# Patient Record
Sex: MALE | Race: WHITE | NOT HISPANIC OR LATINO | Employment: UNEMPLOYED | URBAN - METROPOLITAN AREA
[De-identification: names, ages, dates, MRNs, and addresses within clinical notes are randomized per-mention and may not be internally consistent; named-entity substitution may affect disease eponyms.]

---

## 2018-09-27 ENCOUNTER — TREATMENT (OUTPATIENT)
Dept: PHYSICAL THERAPY | Facility: CLINIC | Age: 33
End: 2018-09-27

## 2018-09-27 ENCOUNTER — TRANSCRIBE ORDERS (OUTPATIENT)
Dept: PHYSICAL THERAPY | Facility: CLINIC | Age: 33
End: 2018-09-27

## 2018-09-27 DIAGNOSIS — Z47.89 ORTHOPEDIC AFTERCARE: ICD-10-CM

## 2018-09-27 DIAGNOSIS — M25.632 WRIST STIFFNESS, LEFT: ICD-10-CM

## 2018-09-27 DIAGNOSIS — R53.1 WEAKNESS: ICD-10-CM

## 2018-09-27 DIAGNOSIS — S61.219A LACERATION OF FINGER OF LEFT HAND WITH TENDON INVOLVEMENT, INITIAL ENCOUNTER: Primary | ICD-10-CM

## 2018-09-27 DIAGNOSIS — M25.532 LEFT WRIST PAIN: Primary | ICD-10-CM

## 2018-09-27 DIAGNOSIS — S61.219A LACERATION OF FINGER OF LEFT HAND WITH COMPLICATION, INITIAL ENCOUNTER: ICD-10-CM

## 2018-09-27 PROCEDURE — 97163 PT EVAL HIGH COMPLEX 45 MIN: CPT | Performed by: PHYSICAL THERAPIST

## 2018-09-27 NOTE — PROGRESS NOTES
Physical Therapy Initial Evaluation and Plan of Care    Patient: Rafael Davis   : 1985  Diagnosis/ICD-10 Code:  Left wrist pain [M25.532]  Referring practitioner: No ref. provider found  Date of Initial Visit: 2018  Today's Date: 2018  Patient seen for 1 sessions             Subjective Evaluation    History of Present Illness  Date of onset: 2018  Date of surgery: 2018  Mechanism of injury: Pt cut the volar side of his wrist and had surgery for repair of all the flexor tendons and nerve of the left wrist/hand. Pt was placed in a post surgical dressing up until one week ago and then he was placed in a fiberglass dorsal block splint. He reports that he will be in that splint for possibly one more week and then he will be out of the splint.     He also has c/o numbness           Patient Occupation: unemployeed  Pain  Current pain ratin  At best pain ratin  At worst pain rating: 10  Location: mostly in the palm of the hand with shooting pains into the fingers and cramping of the hand   Quality: cramping (shooting pain)  Relieving factors: rest  Aggravating factors: movement  Progression: no change    Hand dominance: right    Treatments  Previous treatment: immobilization  Patient Goals  Patient goals for therapy: decreased edema, decreased pain, increased motion, increased strength, independence with ADLs/IADLs, return to sport/leisure activities and return to work  Patient goal: Get back to playing piano and drums            Objective       Palpation     Additional Palpation Details  Mild to moderate tenderness to palpation, especially around the incison scar and in the palm    Passive Range of Motion     Right Wrist   Wrist flexion: 51 degrees     Left Thumb   Flexion     MP: 38 degrees    DIP: 20 degrees  Extension     MP: 18 degrees    DIP: 14 degrees  Palmar Abduction     CMC: 27 degrees    Left Digits   Flexion   Index     MCP: 90 degrees    PIP: 45 degrees    DIP: 50  degrees  Middle     MCP: 95 degrees    PIP: 70 degrees    DIP: 54 degrees  Ring     MCP: 96 degrees    PIP: 80 degrees    DIP: 47 degrees  Little     MCP: 76 degrees    PIP: 104 degrees    DIP: 61 degrees         Assessment & Plan     Assessment  Impairments: abnormal muscle firing, abnormal muscle tone, abnormal or restricted ROM, activity intolerance, impaired physical strength, lacks appropriate home exercise program and pain with function  Assessment details: Patient is a 33 year old male who comes to physical therapy with laceration injury to the left wrist with extensive tendon, nerve, and vascular damage resulting in pain, decreased ROM, decreased strength, and inability to perform all essential functional activities. Pt will benefit from skilled PT services to address the above issues.     Prognosis: fair  Prognosis details: Given the extensive damage to contractile, neurological, and vascular tissues as well as the significant scarring that can occur with this type of injury/surgery, this patient will require significant amounts of therapy.    Functional Limitations: carrying objects, lifting, pulling, pushing, uncomfortable because of pain, reaching overhead and unable to perform repetitive tasks  Goals  Plan Goals: SHORT TERM GOALS:   6 weeks    1. Pt to be I with HEP  2. left hand/wrist AROM equal to that of the right hand/wrist to show improve mobility   3. Pt to report 0/10 pain at rest in the left hand/wrist    LONG TERM GOALS:    12 weeks  1. Pt to demonstrate left  strength within 10# or equal to that of right for improved ability to perform lifting activities  2. Pt to report being able to return to work full duty without limitation or exacerbation of symptoms  3. Pt to report being able to play the piano without limitation.         Plan  Therapy options: will be seen for skilled physical therapy services  Planned modality interventions: cryotherapy, electrical stimulation/Russian stimulation,  fluidotherapy, high voltage pulsed current (pain management), iontophoresis, microcurrent electrical stimulation, TENS, thermotherapy (hydrocollator packs) and ultrasound  Planned therapy interventions: ADL retraining, body mechanics training, fine motor coordination training, flexibility, functional ROM exercises, home exercise program, IADL retraining, joint mobilization, manual therapy, motor coordination training, neuromuscular re-education, soft tissue mobilization, strengthening, stretching and therapeutic activities  Frequency: 3x week  Duration in weeks: 12        Evaluation Only     PT SIGNATURE: He Saleem, PT, DPT, OCS, Cert. DN   DATE TREATMENT INITIATED: 9/27/2018    Initial Certification  Certification Period: 12/26/2018  I certify that the therapy services are furnished while this patient is under my care.  The services outlined above are required by this patient, and will be reviewed every 90 days.     PHYSICIAN:       DATE:     Please sign and return via fax to [phone].. Thank you, [facility].

## 2018-10-01 ENCOUNTER — TREATMENT (OUTPATIENT)
Dept: PHYSICAL THERAPY | Facility: CLINIC | Age: 33
End: 2018-10-01

## 2018-10-01 DIAGNOSIS — M25.532 LEFT WRIST PAIN: Primary | ICD-10-CM

## 2018-10-01 DIAGNOSIS — M25.632 WRIST STIFFNESS, LEFT: ICD-10-CM

## 2018-10-01 DIAGNOSIS — R53.1 WEAKNESS: ICD-10-CM

## 2018-10-01 PROCEDURE — 97140 MANUAL THERAPY 1/> REGIONS: CPT | Performed by: PHYSICAL THERAPIST

## 2018-10-01 PROCEDURE — 97110 THERAPEUTIC EXERCISES: CPT | Performed by: PHYSICAL THERAPIST

## 2018-10-03 NOTE — PROGRESS NOTES
Physical Therapy Daily Progress Note    Subjective   Rafael Davis reports that he has been working on a few things at home that were shown to him at the initial evaluation. He continues to have some pain in the palm of the hand and along the incision scar.    Today's Pain ratin/10    Objective   See Exercise, Manual, and Modality Logs for complete treatment.       Assessment/Plan     Gentle AROM exercise performed in the restrictions of the brace. PROM performed into flexion and extension in the brace as well. Pt demonstrated good tolerance to manual therapy in the clinic. He will be going back to see the MD on Wednesday.     Progress per Plan of Care and Progress strengthening /stabilization /functional activity           Manual Therapy:    40     mins  13202;  Therapeutic Exercise:    16     mins  10945;     Neuromuscular Vikki:        mins  78778;    Therapeutic Activity:          mins  95549;     Gait Training:           mins  84926;     Ultrasound:          mins  28371;    Electrical Stimulation:         mins  64570 ( );  Iontophoresis          mins 63600   Traction          mins  79833  Fluidotherapy          mins  69087  Dry Needling          mins self-pay  Paraffin          mins  04173    Timed Treatment:   56   mins   Total Treatment:     56   mins    He Saleem, PT, DPT, OCS, Cert. DN  Physical Therapist

## 2018-10-04 ENCOUNTER — TREATMENT (OUTPATIENT)
Dept: PHYSICAL THERAPY | Facility: CLINIC | Age: 33
End: 2018-10-04

## 2018-10-04 DIAGNOSIS — R53.1 WEAKNESS: ICD-10-CM

## 2018-10-04 DIAGNOSIS — M25.532 LEFT WRIST PAIN: Primary | ICD-10-CM

## 2018-10-04 DIAGNOSIS — M25.632 WRIST STIFFNESS, LEFT: ICD-10-CM

## 2018-10-04 PROCEDURE — 97140 MANUAL THERAPY 1/> REGIONS: CPT | Performed by: PHYSICAL THERAPIST

## 2018-10-04 PROCEDURE — 97110 THERAPEUTIC EXERCISES: CPT | Performed by: PHYSICAL THERAPIST

## 2018-10-08 ENCOUNTER — TREATMENT (OUTPATIENT)
Dept: PHYSICAL THERAPY | Facility: CLINIC | Age: 33
End: 2018-10-08

## 2018-10-08 DIAGNOSIS — M25.632 WRIST STIFFNESS, LEFT: ICD-10-CM

## 2018-10-08 DIAGNOSIS — R53.1 WEAKNESS: ICD-10-CM

## 2018-10-08 DIAGNOSIS — M25.532 LEFT WRIST PAIN: Primary | ICD-10-CM

## 2018-10-08 PROCEDURE — 97140 MANUAL THERAPY 1/> REGIONS: CPT | Performed by: PHYSICAL THERAPIST

## 2018-10-08 PROCEDURE — 97110 THERAPEUTIC EXERCISES: CPT | Performed by: PHYSICAL THERAPIST

## 2018-10-08 PROCEDURE — 97035 APP MDLTY 1+ULTRASOUND EA 15: CPT | Performed by: PHYSICAL THERAPIST

## 2018-10-08 NOTE — PROGRESS NOTES
Physical Therapy Daily Progress Note    Subjective   Rafael Davis reports that he has been cleared to discontinue with use of the brace, he was also instructed to not forcefully extend the wrist of /lift with the left hand.       Objective   See Exercise, Manual, and Modality Logs for complete treatment.       Assessment/Plan     Pt had some soreness with therapy in the clinic today but he did not report any excessive pain. Will continue to progress as appropriate.     Progress per Plan of Care and Progress strengthening /stabilization /functional activity           Manual Therapy:    25     mins  55834;  Therapeutic Exercise:    32     mins  48016;     Neuromuscular Vikki:        mins  01676;    Therapeutic Activity:          mins  45414;     Gait Training:           mins  63564;     Ultrasound:          mins  76994;    Electrical Stimulation:         mins  97933 ( );  Iontophoresis          mins 41962   Traction          mins  61841  Fluidotherapy     15     mins  36764  Dry Needling          mins self-pay  Paraffin          mins  34646    Timed Treatment:   57   mins   Total Treatment:     72   mins    He Saleem, PT, DPT, OCS, Cert. DN  Physical Therapist

## 2018-10-10 ENCOUNTER — TREATMENT (OUTPATIENT)
Dept: PHYSICAL THERAPY | Facility: CLINIC | Age: 33
End: 2018-10-10

## 2018-10-10 PROCEDURE — 97140 MANUAL THERAPY 1/> REGIONS: CPT | Performed by: PHYSICAL THERAPIST

## 2018-10-10 PROCEDURE — 97035 APP MDLTY 1+ULTRASOUND EA 15: CPT | Performed by: PHYSICAL THERAPIST

## 2018-10-10 PROCEDURE — 97110 THERAPEUTIC EXERCISES: CPT | Performed by: PHYSICAL THERAPIST

## 2018-10-10 NOTE — PROGRESS NOTES
Physical Therapy Daily Progress Note    Subjective   Rafael Davis reports that he is feeling like he has more swelling today and has recently had more swelling in the forearm, bernadette along the initial injury scar and at the volar and ulnar aspect of the forearm.       Objective   See Exercise, Manual, and Modality Logs for complete treatment.       Assessment/Plan     Pt is progressing well with therapy, he had some increased swelling today and he had some concern about redness in one of the incision scars, but it appears to be an area where he has a stitch coming through. Overall pt is making steady progress within the limitations of his post surgical protocol.     Progress per Plan of Care and Progress strengthening /stabilization /functional activity           Manual Therapy:    16     mins  58671;  Therapeutic Exercise:    28     mins  56317;     Neuromuscular Vikki:        mins  11278;    Therapeutic Activity:          mins  30547;     Gait Training:           mins  45083;     Ultrasound:     13     mins  92785;    Electrical Stimulation:         mins  35959 ( );  Iontophoresis          mins 84600   Traction          mins  62000  Fluidotherapy     15     mins  55583  Dry Needling          mins self-pay  Paraffin          mins  96376    Timed Treatment:   57   mins   Total Treatment:     72   mins    He Saleem, PT, DPT, OCS, Cert. DN  Physical Therapist

## 2018-10-12 NOTE — PROGRESS NOTES
Physical Therapy Daily Progress Note    Subjective   Rafael Davis reports that the swelling he had earlier in the weeks seems to be going down now, he feels like he is able to move his fingers more now and isolate his movement now as well.     Objective   See Exercise, Manual, and Modality Logs for complete treatment.       Assessment/Plan     Pt is making steady progress with therapy and he demonstrates and improvement in his AROM within the limits of post surgical protocol.     Progress per Plan of Care and Progress strengthening /stabilization /functional activity           Manual Therapy:    30     mins  60322;  Therapeutic Exercise:    18     mins  65109;     Neuromuscular Vikki:        mins  71805;    Therapeutic Activity:          mins  98142;     Gait Training:           mins  64640;     Ultrasound:     13     mins  98351;    Electrical Stimulation:         mins  14571 ( );  Iontophoresis          mins 31847   Traction          mins  54965  Fluidotherapy     15     mins  43682  Dry Needling          mins self-pay  Paraffin          mins  78410    Timed Treatment:   61   mins   Total Treatment:     76   mins    He Saleem, PT, DPT, OCS, Cert. DN  Physical Therapist

## 2018-10-17 ENCOUNTER — TREATMENT (OUTPATIENT)
Dept: PHYSICAL THERAPY | Facility: CLINIC | Age: 33
End: 2018-10-17

## 2018-10-17 DIAGNOSIS — M25.532 LEFT WRIST PAIN: Primary | ICD-10-CM

## 2018-10-17 DIAGNOSIS — M25.632 WRIST STIFFNESS, LEFT: ICD-10-CM

## 2018-10-17 DIAGNOSIS — R53.1 WEAKNESS: ICD-10-CM

## 2018-10-17 PROCEDURE — 97110 THERAPEUTIC EXERCISES: CPT | Performed by: PHYSICAL THERAPIST

## 2018-10-17 PROCEDURE — 97140 MANUAL THERAPY 1/> REGIONS: CPT | Performed by: PHYSICAL THERAPIST

## 2018-10-18 NOTE — PROGRESS NOTES
Physical Therapy Daily Progress Note    Subjective   Rafael Davis reports that he has been playing the piano more, but he still has swelling the day after he does more activity.       Objective   See Exercise, Manual, and Modality Logs for complete treatment.       Assessment/Plan     Initiated some PROM extension of the wrist and fingers today. NMES/russion e-stim performed today in the clinic to improve muscle avtivation of the wrist flexors.     Progress per Plan of Care and Progress strengthening /stabilization /functional activity           Manual Therapy:    40     mins  18261;  Therapeutic Exercise:    15     mins  60037;     Neuromuscular Vikki:        mins  70342;    Therapeutic Activity:          mins  76330;     Gait Training:           mins  47149;     Ultrasound:     13     mins  51102;    Electrical Stimulation:    20     mins  98464 ( );  Iontophoresis          mins 26858   Traction          mins  84855  Fluidotherapy    15      mins  70183  Dry Needling          mins self-pay  Paraffin          mins  79436    Timed Treatment:   68   mins   Total Treatment:     103   mins    He Saleem, PT, DPT, OCS, Cert. DN  Physical Therapist

## 2018-10-19 ENCOUNTER — TREATMENT (OUTPATIENT)
Dept: PHYSICAL THERAPY | Facility: CLINIC | Age: 33
End: 2018-10-19

## 2018-10-19 DIAGNOSIS — M25.632 WRIST STIFFNESS, LEFT: ICD-10-CM

## 2018-10-19 DIAGNOSIS — R53.1 WEAKNESS: ICD-10-CM

## 2018-10-19 DIAGNOSIS — M25.532 LEFT WRIST PAIN: Primary | ICD-10-CM

## 2018-10-19 PROCEDURE — 97110 THERAPEUTIC EXERCISES: CPT | Performed by: PHYSICAL THERAPIST

## 2018-10-19 PROCEDURE — 97035 APP MDLTY 1+ULTRASOUND EA 15: CPT | Performed by: PHYSICAL THERAPIST

## 2018-10-19 PROCEDURE — 97112 NEUROMUSCULAR REEDUCATION: CPT | Performed by: PHYSICAL THERAPIST

## 2018-10-19 PROCEDURE — 97140 MANUAL THERAPY 1/> REGIONS: CPT | Performed by: PHYSICAL THERAPIST

## 2018-10-22 ENCOUNTER — TREATMENT (OUTPATIENT)
Dept: PHYSICAL THERAPY | Facility: CLINIC | Age: 33
End: 2018-10-22

## 2018-10-22 DIAGNOSIS — M25.532 LEFT WRIST PAIN: Primary | ICD-10-CM

## 2018-10-22 DIAGNOSIS — R53.1 WEAKNESS: ICD-10-CM

## 2018-10-22 DIAGNOSIS — M25.632 WRIST STIFFNESS, LEFT: ICD-10-CM

## 2018-10-22 PROCEDURE — 97140 MANUAL THERAPY 1/> REGIONS: CPT | Performed by: PHYSICAL THERAPIST

## 2018-10-22 PROCEDURE — 97110 THERAPEUTIC EXERCISES: CPT | Performed by: PHYSICAL THERAPIST

## 2018-10-22 PROCEDURE — 97035 APP MDLTY 1+ULTRASOUND EA 15: CPT | Performed by: PHYSICAL THERAPIST

## 2018-10-22 NOTE — PROGRESS NOTES
Subjective   Rafael Davis reports: Wrist pain can get up to about 8/10 at times.  Now 4/10.  Some numbness and tingling in the IF and Thumb.  Fingers are very stiff. Did Play the piano for about 2 hours.    Objective   OBSERVATION:  He can begin to make an incomplete hook fist on LF/RF/SF.  He cannot make straight fist with any finger, observed scar at volar wrist/forearm pull proximally when this is attempted.   ROM: Finger PROM is functional, has significant limitation of AROM of fingers.   See Exercise, Manual, and Modality Logs for complete treatment.       Assessment/Plan  Suspect FDS is experiencing scar adhesion. Nice progression of the finger IP joint capsulitis issues. Very common for zone IV and V laceration to scar significantly.  Pt is far enough out of surgery he can become more aggressive with his ROM and activities at home.  Progress per Plan of Care and Progress strengthening /stabilization /functional activity           Manual Therapy:    35     mins  43159;  Therapeutic Exercise:    23     mins  67662;     Neuromuscular Vikki:   15     mins  38575;    Therapeutic Activity:          mins  95295;     Gait Training:           mins  38728;     Ultrasound:     13     mins  57057;   Iontophoresis          mins  45092   Electrical Stimulation:         mins  18617 ( );  Dry Needling          mins self-pay  Fluidotherapy          mins 50804  Traction          mins 58229  Paraffin:          mins  24768    Timed Treatment:   86   mins   Total Treatment:     86   mins    Gabino Cabrales, PT  Physical Therapist

## 2018-10-23 NOTE — PROGRESS NOTES
Physical Therapy Daily Progress Note    Subjective   Rafael Davis reports that he has been working on playing piano and drums more lately. He has also been working a lot on AROM of the finger and thumb. He did have some discomfort and grinding noted in the hand/wrist when doing AROM of the thumb and his swelling has been worse since that time.       Objective   See Exercise, Manual, and Modality Logs for complete treatment.       Assessment/Plan     Based on patients description and increase in swelling in the hand, it appears that he may have caused some breaking of adhesions of the thumb flexors when performing AROM of the thumb at home. Initiated place and hold exercise in the clinic today and conituned to progress PROM of the wrist and fingers in both flexion and gentle extension.     Progress per Plan of Care and Progress strengthening /stabilization /functional activity           Manual Therapy:    34     mins  69637;  Therapeutic Exercise:    20     mins  85396;     Neuromuscular Vikki:        mins  70045;    Therapeutic Activity:          mins  04627;     Gait Training:           mins  47240;     Ultrasound:     13     mins  95531;    Electrical Stimulation:         mins  40646 ( );  Iontophoresis          mins 28035   Traction          mins  16026  Fluidotherapy     15     mins  35302  Dry Needling          mins self-pay  Paraffin          mins  92467    Timed Treatment:   67   mins   Total Treatment:     82   mins    He Saleem, PT, DPT, OCS, Cert. DN  Physical Therapist

## 2018-10-24 ENCOUNTER — TREATMENT (OUTPATIENT)
Dept: PHYSICAL THERAPY | Facility: CLINIC | Age: 33
End: 2018-10-24

## 2018-10-24 DIAGNOSIS — M25.532 LEFT WRIST PAIN: Primary | ICD-10-CM

## 2018-10-24 DIAGNOSIS — R53.1 WEAKNESS: ICD-10-CM

## 2018-10-24 DIAGNOSIS — M25.632 WRIST STIFFNESS, LEFT: ICD-10-CM

## 2018-10-24 PROCEDURE — 97110 THERAPEUTIC EXERCISES: CPT | Performed by: PHYSICAL THERAPIST

## 2018-10-24 PROCEDURE — 97140 MANUAL THERAPY 1/> REGIONS: CPT | Performed by: PHYSICAL THERAPIST

## 2018-10-26 ENCOUNTER — TREATMENT (OUTPATIENT)
Dept: PHYSICAL THERAPY | Facility: CLINIC | Age: 33
End: 2018-10-26

## 2018-10-26 DIAGNOSIS — M25.632 WRIST STIFFNESS, LEFT: ICD-10-CM

## 2018-10-26 DIAGNOSIS — M25.532 LEFT WRIST PAIN: Primary | ICD-10-CM

## 2018-10-26 DIAGNOSIS — R53.1 WEAKNESS: ICD-10-CM

## 2018-10-26 PROCEDURE — 97035 APP MDLTY 1+ULTRASOUND EA 15: CPT | Performed by: PHYSICAL THERAPIST

## 2018-10-26 PROCEDURE — 97140 MANUAL THERAPY 1/> REGIONS: CPT | Performed by: PHYSICAL THERAPIST

## 2018-10-26 NOTE — PROGRESS NOTES
Physical Therapy Daily Progress Note    Subjective   Rafael Davis reports that he is continuing to play the piano at home and he is doing more drumming recently as well. He has periods of swelling that seems to limit his motion.       Objective   See Exercise, Manual, and Modality Logs for complete treatment.       Assessment/Plan     Tendon glide seems to be limited by some scar adhesion at the original laceration scar where the surgical incision scar intersects. Will continue to work on scar massage and IASTM for scar mobility and progress strengthening of FDS.     Progress per Plan of Care and Progress strengthening /stabilization /functional activity           Manual Therapy:    32     mins  37884;  Therapeutic Exercise:    28     mins  31429;     Neuromuscular Vikki:        mins  22528;    Therapeutic Activity:          mins  06076;     Gait Training:           mins  54055;     Ultrasound:     13     mins  10336;    Electrical Stimulation:         mins  35091 ( );  Iontophoresis          mins 49940   Traction          mins  82649  Fluidotherapy     15     mins  21411  Dry Needling          mins self-pay  Paraffin          mins  11221    Timed Treatment:   73   mins   Total Treatment:     88   mins    He Saleem, PT, DPT, OCS, Cert. DN  Physical Therapist

## 2018-10-29 ENCOUNTER — TREATMENT (OUTPATIENT)
Dept: PHYSICAL THERAPY | Facility: CLINIC | Age: 33
End: 2018-10-29

## 2018-10-29 DIAGNOSIS — R53.1 WEAKNESS: Primary | ICD-10-CM

## 2018-10-29 DIAGNOSIS — M25.532 LEFT WRIST PAIN: ICD-10-CM

## 2018-10-29 DIAGNOSIS — M25.632 WRIST STIFFNESS, LEFT: ICD-10-CM

## 2018-10-29 PROCEDURE — 97110 THERAPEUTIC EXERCISES: CPT | Performed by: PHYSICAL THERAPIST

## 2018-10-29 PROCEDURE — 97140 MANUAL THERAPY 1/> REGIONS: CPT | Performed by: PHYSICAL THERAPIST

## 2018-10-29 NOTE — PROGRESS NOTES
Physical Therapy Daily Progress Note    Subjective   Rafael Davis reports that he is doing well this week and he is having less swelling and pain. He has been working on tendon glides at home and getting back to playing piano and drums.       Objective   See Exercise, Manual, and Modality Logs for complete treatment.       Assessment/Plan     Continued to work on scar massage at the wrist due to some scarring down of the tendons noted with finger flexion. Improvement noted with PROM wrist extension and finger extension.     Progress per Plan of Care and Progress strengthening /stabilization /functional activity           Manual Therapy:    40     mins  48846;  Therapeutic Exercise:         mins  87230;     Neuromuscular Vikki:        mins  30341;    Therapeutic Activity:          mins  15724;     Gait Training:           mins  93218;     Ultrasound:     13     mins  00791;    Electrical Stimulation:         mins  86627 ( );  Iontophoresis          mins 78967   Traction          mins  29994  Fluidotherapy     15     mins  09342  Dry Needling          mins self-pay  Paraffin          mins  46147    Timed Treatment:   53   mins   Total Treatment:     68   mins    He Saleem, PT, DPT, OCS, Cert. DN  Physical Therapist

## 2018-10-31 ENCOUNTER — TREATMENT (OUTPATIENT)
Dept: PHYSICAL THERAPY | Facility: CLINIC | Age: 33
End: 2018-10-31

## 2018-10-31 DIAGNOSIS — R53.1 WEAKNESS: Primary | ICD-10-CM

## 2018-10-31 DIAGNOSIS — M25.632 WRIST STIFFNESS, LEFT: ICD-10-CM

## 2018-10-31 DIAGNOSIS — M25.532 LEFT WRIST PAIN: ICD-10-CM

## 2018-10-31 PROCEDURE — 97140 MANUAL THERAPY 1/> REGIONS: CPT | Performed by: PHYSICAL THERAPIST

## 2018-10-31 PROCEDURE — 97110 THERAPEUTIC EXERCISES: CPT | Performed by: PHYSICAL THERAPIST

## 2018-10-31 NOTE — PROGRESS NOTES
Physical Therapy Daily Progress Note    Subjective   Rafael Davis reports that he is feeling a little more swollen and sore today. He continues to have good and bad days with his swelling and ROM      Objective   See Exercise, Manual, and Modality Logs for complete treatment.       Assessment/Plan     Pt is progressing well with therapy and he demonstrates an improvement in ability to perform light strengthening activities in the clinic. Will continue to progress as tolerated.    Progress per Plan of Care and Progress strengthening /stabilization /functional activity           Manual Therapy:    32     mins  81998;  Therapeutic Exercise:    24     mins  34778;     Neuromuscular Vikki:        mins  18035;    Therapeutic Activity:          mins  31509;     Gait Training:           mins  99576;     Ultrasound:     13     mins  52387;    Electrical Stimulation:         mins  78817 ( );  Iontophoresis          mins 61553   Traction          mins  40854  Fluidotherapy          mins  39631  Dry Needling          mins self-pay  Paraffin          mins  26112    Timed Treatment:   56   mins   Total Treatment:     69   mins    He Saleem, PT, DPT, OCS, Cert. DN  Physical Therapist

## 2018-11-01 NOTE — PROGRESS NOTES
Physical Therapy Daily Progress Note    Subjective   Rafael Davis reports that he feels like he is making progress and he has been doing a lot of exercise at home and continuing to play piano and drums.       Objective   See Exercise, Manual, and Modality Logs for complete treatment.       Assessment/Plan     Pt is progressing well with therapy and he shows an improvement in his tolerance to activity and AROM of all fingers and the wrist. Will continue to progress functional activity as indicated.     Progress per Plan of Care and Progress strengthening /stabilization /functional activity           Manual Therapy:    32     mins  35166;  Therapeutic Exercise:    30     mins  12872;     Neuromuscular Vikki:        mins  31918;    Therapeutic Activity:          mins  95194;     Gait Training:           mins  80274;     Ultrasound:     13     mins  96234;    Electrical Stimulation:         mins  09554 ( );  Iontophoresis          mins 65704   Traction          mins  29833  Fluidotherapy     15     mins  80248  Dry Needling          mins self-pay  Paraffin          mins  81259    Timed Treatment:   75   mins   Total Treatment:     90   mins    He Saleem, PT, DPT, OCS, Cert. DN  Physical Therapist

## 2018-11-02 ENCOUNTER — TREATMENT (OUTPATIENT)
Dept: PHYSICAL THERAPY | Facility: CLINIC | Age: 33
End: 2018-11-02

## 2018-11-02 DIAGNOSIS — M25.632 WRIST STIFFNESS, LEFT: ICD-10-CM

## 2018-11-02 DIAGNOSIS — M25.532 LEFT WRIST PAIN: ICD-10-CM

## 2018-11-02 DIAGNOSIS — R53.1 WEAKNESS: Primary | ICD-10-CM

## 2018-11-02 PROCEDURE — 97110 THERAPEUTIC EXERCISES: CPT | Performed by: PHYSICAL THERAPIST

## 2018-11-02 PROCEDURE — 97035 APP MDLTY 1+ULTRASOUND EA 15: CPT | Performed by: PHYSICAL THERAPIST

## 2018-11-02 PROCEDURE — 97140 MANUAL THERAPY 1/> REGIONS: CPT | Performed by: PHYSICAL THERAPIST

## 2018-11-02 NOTE — PROGRESS NOTES
Physical Therapy Daily Progress Note        Patient: Rafael Davis   : 1985  Diagnosis/ICD-10 Code:  Weakness [R53.1]  Referring practitioner: JAYLA Pedroza  Date of Initial Visit: Type: THERAPY  Noted: 2018  Today's Date: 2018  Patient seen for 13 sessions           Subjective   Rafael Davis reports: was playing air [employer] yesterday and the cording motion really seemed to loosen the fingers a lot.    Objective   OBSERVATION: Fair+ hook fist with LF, Good - with RF/SF.  IF still limited.  When IF tries to flex surgical scar pulls proximal.  See Exercise, Manual, and Modality Logs for complete treatment.       Assessment/Plan  Tremendous improvement, really ahead of what is expected for him.  IF flexor tendon adhesion about zone V, improving slowly.  Limited thumb CMC joint flexion, suspect due to median nerve injury.  Progress per Plan of Care and Progress strengthening /stabilization /functional activity           Manual Therapy:    20     mins  01494;  Therapeutic Exercise:    35     mins  09709;     Neuromuscular Vikki:        mins  87612;    Therapeutic Activity:          mins  61248;     Gait Training:           mins  57746;     Ultrasound:     13     mins  77545;    Electrical Stimulation:         mins  98051 ( );  Iontophoresis          mins 30066   Traction          mins  39070  Fluidotherapy          mins  94619  Dry Needling          mins self-pay  Paraffin          mins  10607    Timed Treatment:   68   mins   Total Treatment:     68   mins    Gabino Cabrales, PT, CHT  Physical Therapist

## 2018-11-05 ENCOUNTER — TREATMENT (OUTPATIENT)
Dept: PHYSICAL THERAPY | Facility: CLINIC | Age: 33
End: 2018-11-05

## 2018-11-05 DIAGNOSIS — M25.632 WRIST STIFFNESS, LEFT: ICD-10-CM

## 2018-11-05 DIAGNOSIS — R53.1 WEAKNESS: Primary | ICD-10-CM

## 2018-11-05 DIAGNOSIS — M25.532 LEFT WRIST PAIN: ICD-10-CM

## 2018-11-05 PROCEDURE — 97110 THERAPEUTIC EXERCISES: CPT | Performed by: PHYSICAL THERAPIST

## 2018-11-05 PROCEDURE — 97140 MANUAL THERAPY 1/> REGIONS: CPT | Performed by: PHYSICAL THERAPIST

## 2018-11-07 ENCOUNTER — TREATMENT (OUTPATIENT)
Dept: PHYSICAL THERAPY | Facility: CLINIC | Age: 33
End: 2018-11-07

## 2018-11-07 DIAGNOSIS — M25.532 LEFT WRIST PAIN: ICD-10-CM

## 2018-11-07 DIAGNOSIS — R53.1 WEAKNESS: Primary | ICD-10-CM

## 2018-11-07 DIAGNOSIS — M25.632 WRIST STIFFNESS, LEFT: ICD-10-CM

## 2018-11-07 PROCEDURE — 97530 THERAPEUTIC ACTIVITIES: CPT | Performed by: PHYSICAL THERAPIST

## 2018-11-07 PROCEDURE — 97110 THERAPEUTIC EXERCISES: CPT | Performed by: PHYSICAL THERAPIST

## 2018-11-07 PROCEDURE — 97140 MANUAL THERAPY 1/> REGIONS: CPT | Performed by: PHYSICAL THERAPIST

## 2018-11-09 ENCOUNTER — TREATMENT (OUTPATIENT)
Dept: PHYSICAL THERAPY | Facility: CLINIC | Age: 33
End: 2018-11-09

## 2018-11-09 DIAGNOSIS — M25.632 WRIST STIFFNESS, LEFT: ICD-10-CM

## 2018-11-09 DIAGNOSIS — R53.1 WEAKNESS: Primary | ICD-10-CM

## 2018-11-09 DIAGNOSIS — M25.532 LEFT WRIST PAIN: ICD-10-CM

## 2018-11-09 PROCEDURE — 97140 MANUAL THERAPY 1/> REGIONS: CPT | Performed by: PHYSICAL THERAPIST

## 2018-11-09 PROCEDURE — 97035 APP MDLTY 1+ULTRASOUND EA 15: CPT | Performed by: PHYSICAL THERAPIST

## 2018-11-09 PROCEDURE — 97530 THERAPEUTIC ACTIVITIES: CPT | Performed by: PHYSICAL THERAPIST

## 2018-11-09 PROCEDURE — 97110 THERAPEUTIC EXERCISES: CPT | Performed by: PHYSICAL THERAPIST

## 2018-11-09 NOTE — PROGRESS NOTES
Physical Therapy Daily Progress Note    Subjective   Rafael Davis reports that he is feeling better, but he still has the blister on the end of his index finger that limits some activity. He feels like he is getting more sensation in his hand and fingers now as well.       Objective   See Exercise, Manual, and Modality Logs for complete treatment.       Assessment/Plan     Continuing to progress patients strengthening in the clinic. He is responding well to treatment and is gradually demonstrating improvement in his ROM and ability to perform functional activities.     Progress per Plan of Care and Progress strengthening /stabilization /functional activity           Manual Therapy:    28     mins  68557;  Therapeutic Exercise:    16     mins  28809;     Neuromuscular Vikki:        mins  72563;    Therapeutic Activity:     15     mins  03671;     Gait Training:           mins  28285;     Ultrasound:     13     mins  79615;    Electrical Stimulation:         mins  11931 ( );  Iontophoresis          mins 53697   Traction          mins  29426  Fluidotherapy     15     mins  80427  Dry Needling          mins self-pay  Paraffin          mins  86776    Timed Treatment:   72   mins   Total Treatment:     87   mins    He Saleem, PT, DPT, OCS, Cert. DN  Physical Therapist

## 2018-11-12 ENCOUNTER — TREATMENT (OUTPATIENT)
Dept: PHYSICAL THERAPY | Facility: CLINIC | Age: 33
End: 2018-11-12

## 2018-11-12 DIAGNOSIS — M25.532 LEFT WRIST PAIN: ICD-10-CM

## 2018-11-12 DIAGNOSIS — M25.632 WRIST STIFFNESS, LEFT: ICD-10-CM

## 2018-11-12 DIAGNOSIS — R53.1 WEAKNESS: Primary | ICD-10-CM

## 2018-11-12 PROCEDURE — 97035 APP MDLTY 1+ULTRASOUND EA 15: CPT | Performed by: PHYSICAL THERAPIST

## 2018-11-12 PROCEDURE — 97110 THERAPEUTIC EXERCISES: CPT | Performed by: PHYSICAL THERAPIST

## 2018-11-12 PROCEDURE — 97530 THERAPEUTIC ACTIVITIES: CPT | Performed by: PHYSICAL THERAPIST

## 2018-11-12 PROCEDURE — 97140 MANUAL THERAPY 1/> REGIONS: CPT | Performed by: PHYSICAL THERAPIST

## 2018-11-12 NOTE — PROGRESS NOTES
Physical Therapy Daily Progress Note    Subjective   Rafael Davis reports that he feels like he is getting more motion in his fingers and he is using the hand for more of his regular daily activities.       Objective   See Exercise, Manual, and Modality Logs for complete treatment.       Assessment/Plan     Pt is making steady progress with therapy and he demonstrates improvement in strength and ROM of the wrist and all fingers.     Progress per Plan of Care and Progress strengthening /stabilization /functional activity           Manual Therapy:    20     mins  57310;  Therapeutic Exercise:    18     mins  32538;     Neuromuscular Vikki:        mins  95272;    Therapeutic Activity:     16     mins  84944;     Gait Training:           mins  57889;     Ultrasound:     13     mins  84539;    Electrical Stimulation:         mins  92513 ( );  Iontophoresis          mins 25457   Traction          mins  05517  Fluidotherapy     15     mins  26643  Dry Needling          mins self-pay  Paraffin          mins  41068    Timed Treatment:   67   mins   Total Treatment:     82   mins    He Saleem, PT, DPT, OCS, Cert. DN  Physical Therapist

## 2018-11-14 ENCOUNTER — TREATMENT (OUTPATIENT)
Dept: PHYSICAL THERAPY | Facility: CLINIC | Age: 33
End: 2018-11-14

## 2018-11-14 DIAGNOSIS — M25.532 LEFT WRIST PAIN: ICD-10-CM

## 2018-11-14 DIAGNOSIS — M25.632 WRIST STIFFNESS, LEFT: ICD-10-CM

## 2018-11-14 DIAGNOSIS — R53.1 WEAKNESS: Primary | ICD-10-CM

## 2018-11-14 PROCEDURE — 97035 APP MDLTY 1+ULTRASOUND EA 15: CPT | Performed by: PHYSICAL THERAPIST

## 2018-11-14 PROCEDURE — 97140 MANUAL THERAPY 1/> REGIONS: CPT | Performed by: PHYSICAL THERAPIST

## 2018-11-14 PROCEDURE — 97110 THERAPEUTIC EXERCISES: CPT | Performed by: PHYSICAL THERAPIST

## 2018-11-14 PROCEDURE — 97530 THERAPEUTIC ACTIVITIES: CPT | Performed by: PHYSICAL THERAPIST

## 2018-11-14 NOTE — PROGRESS NOTES
Physical Therapy Daily Progress Note    Subjective   Rafael Davis reports that he feels like he is able to do a lot more with the hand and he feels that his finger ROM is improving with all fingers.       Objective   See Exercise, Manual, and Modality Logs for complete treatment.       Assessment/Plan     Initiated functional strengthening with the left hand using the BTE today. Pt had some popping in the wrist but he felt like his finger ROM improved after the popping.     Progress per Plan of Care and Progress strengthening /stabilization /functional activity           Manual Therapy:    18     mins  81264;  Therapeutic Exercise:    15     mins  93373;     Neuromuscular Vikki:        mins  94163;    Therapeutic Activity:     15     mins  61246;     Gait Training:           mins  41339;     Ultrasound:     13     mins  53498;    Electrical Stimulation:         mins  12851 ( );  Iontophoresis          mins 72740   Traction          mins  20048  Fluidotherapy          mins  27661  Dry Needling          mins self-pay  Paraffin          mins  15190    Timed Treatment:   61   mins   Total Treatment:     61   mins    He Saleem, PT, DPT, OCS, Cert. DN  Physical Therapist

## 2018-11-19 NOTE — PROGRESS NOTES
Physical Therapy Daily Progress Note    Subjective   Rafael Davis reports that sandra has been feeling better, he continues to try to use the hand as much as possible and has been playing more piano recently.       Objective   See Exercise, Manual, and Modality Logs for complete treatment.       Assessment/Plan     Pt is making steady progress and he demonstrates improved quality of motion and strength with all fiongers but remains somewhatr limited with flexion of the index finger. He is able to perform place and hold with the index finger.     Progress per Plan of Care and Progress strengthening /stabilization /functional activity           Manual Therapy:    18     mins  02227;  Therapeutic Exercise:    15     mins  67525;     Neuromuscular Vikki:        mins  65328;    Therapeutic Activity:     20     mins  90348;     Gait Training:           mins  98866;     Ultrasound:     13     mins  98276;    Electrical Stimulation:         mins  49995 ( );  Iontophoresis          mins 69356   Traction          mins  44841  Fluidotherapy     15     mins  15965  Dry Needling          mins self-pay  Paraffin          mins  09151    Timed Treatment:   66   mins   Total Treatment:     71   mins    He Saleem, PT, DPT, OCS, Cert. DN  Physical Therapist

## 2018-11-20 ENCOUNTER — TREATMENT (OUTPATIENT)
Dept: PHYSICAL THERAPY | Facility: CLINIC | Age: 33
End: 2018-11-20

## 2018-11-20 DIAGNOSIS — M25.532 LEFT WRIST PAIN: ICD-10-CM

## 2018-11-20 DIAGNOSIS — R53.1 WEAKNESS: Primary | ICD-10-CM

## 2018-11-20 DIAGNOSIS — M25.632 WRIST STIFFNESS, LEFT: ICD-10-CM

## 2018-11-20 PROCEDURE — 97110 THERAPEUTIC EXERCISES: CPT | Performed by: PHYSICAL THERAPIST

## 2018-11-20 PROCEDURE — 97530 THERAPEUTIC ACTIVITIES: CPT | Performed by: PHYSICAL THERAPIST

## 2018-11-20 PROCEDURE — 97140 MANUAL THERAPY 1/> REGIONS: CPT | Performed by: PHYSICAL THERAPIST

## 2018-11-26 NOTE — PROGRESS NOTES
Physical Therapy Daily Progress Note    Subjective   Rafael Davis reports that he is feeling better overall, he feels like he is making progress every week.       Objective   See Exercise, Manual, and Modality Logs for complete treatment.       Assessment/Plan     Pt is progressing well and he is able to do more exercise and functional activity in the clinic as well as at home and when playing music.     Progress per Plan of Care and Progress strengthening /stabilization /functional activity           Manual Therapy:    25     mins  48479;  Therapeutic Exercise:    15     mins  14351;     Neuromuscular Vikki:        mins  18376;    Therapeutic Activity:     20     mins  00546;     Gait Training:           mins  88902;     Ultrasound:     13     mins  29654;    Electrical Stimulation:         mins  42364 ( );  Iontophoresis          mins 12675   Traction          mins  11806  Fluidotherapy     15     mins  82834  Dry Needling          mins self-pay  Paraffin          mins  62944    Timed Treatment:   73   mins   Total Treatment:     88   mins    He Saleem, PT, DPT, OCS, Cert. DN  Physical Therapist

## 2018-11-30 ENCOUNTER — TREATMENT (OUTPATIENT)
Dept: PHYSICAL THERAPY | Facility: CLINIC | Age: 33
End: 2018-11-30

## 2018-11-30 DIAGNOSIS — M25.532 LEFT WRIST PAIN: Primary | ICD-10-CM

## 2018-11-30 DIAGNOSIS — M25.632 WRIST STIFFNESS, LEFT: ICD-10-CM

## 2018-11-30 DIAGNOSIS — R53.1 WEAKNESS: ICD-10-CM

## 2018-11-30 PROCEDURE — 97530 THERAPEUTIC ACTIVITIES: CPT | Performed by: PHYSICAL THERAPIST

## 2018-11-30 PROCEDURE — 97110 THERAPEUTIC EXERCISES: CPT | Performed by: PHYSICAL THERAPIST

## 2018-11-30 PROCEDURE — 97140 MANUAL THERAPY 1/> REGIONS: CPT | Performed by: PHYSICAL THERAPIST

## 2018-12-04 ENCOUNTER — TREATMENT (OUTPATIENT)
Dept: PHYSICAL THERAPY | Facility: CLINIC | Age: 33
End: 2018-12-04

## 2018-12-04 DIAGNOSIS — R53.1 WEAKNESS: ICD-10-CM

## 2018-12-04 DIAGNOSIS — M25.532 LEFT WRIST PAIN: Primary | ICD-10-CM

## 2018-12-04 DIAGNOSIS — M25.632 WRIST STIFFNESS, LEFT: ICD-10-CM

## 2018-12-04 PROCEDURE — 97530 THERAPEUTIC ACTIVITIES: CPT | Performed by: PHYSICAL THERAPIST

## 2018-12-04 PROCEDURE — 97035 APP MDLTY 1+ULTRASOUND EA 15: CPT | Performed by: PHYSICAL THERAPIST

## 2018-12-04 PROCEDURE — 97110 THERAPEUTIC EXERCISES: CPT | Performed by: PHYSICAL THERAPIST

## 2018-12-04 PROCEDURE — 97140 MANUAL THERAPY 1/> REGIONS: CPT | Performed by: PHYSICAL THERAPIST

## 2018-12-06 NOTE — PROGRESS NOTES
Physical Therapy Daily Progress Note    Subjective   Rafael Davis reports that he has been feeling very good but recently he is having a little more stiffness and difficulty with gripping. He played a lot over the weekend and feels like this may have caused some of the stiffness.       Objective   See Exercise, Manual, and Modality Logs for complete treatment.       Assessment/Plan     Pt continues to make steady progress. There is not much limitation noted with PROM of the hand and wrist and minor if any adhesions noted with the tendons of the wrist and hand.     Progress per Plan of Care and Progress strengthening /stabilization /functional activity           Manual Therapy:    16     mins  43329;  Therapeutic Exercise:    18     mins  67665;     Neuromuscular Vikki:        mins  89596;    Therapeutic Activity:     20     mins  25732;     Gait Training:           mins  29011;     Ultrasound:     13     mins  51117;    Electrical Stimulation:         mins  42432 ( );  Iontophoresis          mins 23908   Traction          mins  81877  Fluidotherapy     15     mins  10801  Dry Needling          mins self-pay  Paraffin          mins  66689    Timed Treatment:   67   mins   Total Treatment:     82   mins    He Saleem, PT, DPT, OCS, Cert. DN  Physical Therapist

## 2018-12-07 ENCOUNTER — TREATMENT (OUTPATIENT)
Dept: PHYSICAL THERAPY | Facility: CLINIC | Age: 33
End: 2018-12-07

## 2018-12-07 DIAGNOSIS — R53.1 WEAKNESS: ICD-10-CM

## 2018-12-07 DIAGNOSIS — M25.632 WRIST STIFFNESS, LEFT: ICD-10-CM

## 2018-12-07 DIAGNOSIS — M25.532 LEFT WRIST PAIN: Primary | ICD-10-CM

## 2018-12-07 PROCEDURE — 97110 THERAPEUTIC EXERCISES: CPT | Performed by: PHYSICAL THERAPIST

## 2018-12-07 PROCEDURE — 97140 MANUAL THERAPY 1/> REGIONS: CPT | Performed by: PHYSICAL THERAPIST

## 2018-12-07 PROCEDURE — 97530 THERAPEUTIC ACTIVITIES: CPT | Performed by: PHYSICAL THERAPIST

## 2018-12-10 NOTE — PROGRESS NOTES
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       Objective   See Exercise, Manual, and Modality Logs for complete treatment.       Assessment/Plan     Pt continues to have progress with therapy, he demonstrates improved strength and tolerance to activity in the clinic and with his usual daily activities.     Progress per Plan of Care and Progress strengthening /stabilization /functional activity           Manual Therapy:    16     mins  10097;  Therapeutic Exercise:    18     mins  73348;     Neuromuscular Vikki:        mins  08701;    Therapeutic Activity:     30     mins  09596;     Gait Training:           mins  35804;     Ultrasound:     13     mins  17466;    Electrical Stimulation:         mins  24146 ( );  Iontophoresis          mins 81596   Traction          mins  73412  Fluidotherapy     15     mins  77651  Dry Needling          mins self-pay  Paraffin          mins  12344    Timed Treatment:   77   mins   Total Treatment:     92   mins    He Saleem, PT, DPT, OCS, Cert. DN  Physical Therapist

## 2018-12-17 ENCOUNTER — TREATMENT (OUTPATIENT)
Dept: PHYSICAL THERAPY | Facility: CLINIC | Age: 33
End: 2018-12-17

## 2018-12-17 DIAGNOSIS — M25.532 LEFT WRIST PAIN: Primary | ICD-10-CM

## 2018-12-17 DIAGNOSIS — R53.1 WEAKNESS: ICD-10-CM

## 2018-12-17 DIAGNOSIS — M25.632 WRIST STIFFNESS, LEFT: ICD-10-CM

## 2018-12-17 PROCEDURE — 97530 THERAPEUTIC ACTIVITIES: CPT | Performed by: PHYSICAL THERAPIST

## 2018-12-17 PROCEDURE — 97110 THERAPEUTIC EXERCISES: CPT | Performed by: PHYSICAL THERAPIST

## 2018-12-17 PROCEDURE — 97140 MANUAL THERAPY 1/> REGIONS: CPT | Performed by: PHYSICAL THERAPIST

## 2018-12-19 NOTE — PROGRESS NOTES
Physical Therapy Daily Progress Note    Subjective   Rafael Davis reports that he is feeling much better overall and he feels like his fingers are moving better and he has decreased restriction. 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       Objective   See Exercise, Manual, and Modality Logs for complete treatment.       Assessment/Plan     Pt is progressing very well with therapy and he demonstrates an improvement in his overll astrength and tolerance to activity. Will continue to progress functional activity as tolerated.     Progress per Plan of Care and Progress strengthening /stabilization /functional activity           Manual Therapy:    16     mins  54881;  Therapeutic Exercise:    30     mins  05315;     Neuromuscular Vikki:        mins  07732;    Therapeutic Activity:     15     mins  51528;     Gait Training:           mins  21495;     Ultrasound:     15     mins  02271;    Electrical Stimulation:         mins  53716 ( );  Iontophoresis          mins 18558   Traction          mins  82036  Fluidotherapy     15     mins  25471  Dry Needling          mins self-pay  Paraffin          mins  12782    Timed Treatment:   76   mins   Total Treatment:     91   mins    He Saleem, PT, DPT, OCS, Cert. DN  Physical Therapist

## 2019-03-27 ENCOUNTER — TREATMENT (OUTPATIENT)
Dept: PHYSICAL THERAPY | Facility: CLINIC | Age: 34
End: 2019-03-27

## 2019-03-27 ENCOUNTER — TRANSCRIBE ORDERS (OUTPATIENT)
Dept: PHYSICAL THERAPY | Facility: CLINIC | Age: 34
End: 2019-03-27

## 2019-03-27 DIAGNOSIS — R53.1 WEAKNESS: ICD-10-CM

## 2019-03-27 DIAGNOSIS — T14.8XXA LACERATION INVOLVING TENDON: Primary | ICD-10-CM

## 2019-03-27 DIAGNOSIS — M25.532 LEFT WRIST PAIN: Primary | ICD-10-CM

## 2019-03-27 DIAGNOSIS — T14.8XXA LACERATION OF NERVE: ICD-10-CM

## 2019-03-27 DIAGNOSIS — M25.632 WRIST STIFFNESS, LEFT: ICD-10-CM

## 2019-03-27 PROCEDURE — 97162 PT EVAL MOD COMPLEX 30 MIN: CPT | Performed by: PHYSICAL THERAPIST

## 2019-03-27 NOTE — PROGRESS NOTES
Physical Therapy Initial Evaluation and Plan of Care    Patient: Rafael Davis   : 1985  Diagnosis/ICD-10 Code:  Left wrist pain [M25.532]  Referring practitioner: JAYLA Pedroza  Date of Initial Visit: 3/27/2019  Today's Date: 3/27/2019  Patient seen for 24 sessions             Subjective Evaluation    History of Present Illness  Mechanism of injury: Pt is coming back to therapy to address issues related to previous tendon/nerve lacerations and surgical repair. He was seen previously at this location and he has been continuing to work on his exercises an recovery independently. At this time he is dealing with continued loss of sensation in all the fingers and the thumb and decreased ability to abduct the thumb.     He has trouble with opening his hand enough due to lack of thumb abduction in order to perform all gripping activities. 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       Patient Occupation: Pt works at an [employer] store  Quality of life: good    Pain  At worst pain ratin  Location: wrist and radial aspect of the hand   Quality: dull ache (stiffness)  Relieving factors: rest and heat  Progression: improved    Hand dominance: right    Diagnostic Tests  Abnormal EMG results: pt is having an EMG soon.    Treatments  Previous treatment: physical therapy and immobilization  Patient Goals  Patient goals for therapy: decreased edema, decreased pain, increased motion, increased strength and independence with ADLs/IADLs             Objective       Active Range of Motion     Left Wrist   Wrist flexion: 65 degrees   Wrist extension: 38 degrees   Radial deviation: 22 degrees   Ulnar deviation: 28 degrees     Left Thumb   Flexion     MP: 59 degrees    DIP: 80 degrees  Palmar Abduction     CMC: 25 degrees  Radial abduction    CMC: 33 degrees    Left Digits    Flexion   Index     MCP: 83 degrees    PIP: 84 degrees    DIP:  66 degrees  Middle     MCP: 86 degrees    PIP: 104 degrees    DIP: 80 degrees  Ring     MCP: 85 degrees    PIP: 100 degrees    DIP: 70 degrees  Little     MCP: 85 degrees    PIP: 97 degrees    DIP: 70 degrees    Strength/Myotome Testing     Left Wrist/Hand      (2nd hand position)     Trial 1: 75 lbs    Thumb Strength  Key/Lateral Pinch     Trial 1: 20 lbs  Palmar/Three-Point Pinch     Trial 1: 5 lbs    Right Wrist/Hand      (2nd hand position)     Trial 1: 143 lbs    Thumb Strength   Key/Lateral Pinch     Trial 1: 28 lbs  Palmar/Three-Point Pinch     Trial 1: 25 lbs    General Comments     Wrist/Hand Comments  See [place] scanned into the media section of epic for detailed sensation testing         Assessment & Plan     Assessment  Impairments: abnormal muscle firing, abnormal muscle tone, abnormal or restricted ROM, activity intolerance, impaired physical strength, lacks appropriate home exercise program and pain with function  Assessment details: Patient is a 34 year old male who comes to physical therapy s/p tendon and nerve laceration and repair with resulting loss of sensation and function resulting in pain, decreased ROM, decreased strength, and inability to perform all essential functional activities. Pt will benefit from skilled PT services to address the above issues.     Prognosis details:   SHORT TERM GOALS:   2 weeks    1. Pt to be I with HEP  2. left hand/wrist AROM equal to that of the right hand/wrist to show improve mobility   3. Pt to report 0/10 pain at rest in the left hand/wrist    LONG TERM GOALS:    4 weeks  1. Pt to demonstrate left  strength equal to that of right for improved ability to perform lifting activities  2. Pt to report being able to return to work full duty without limitation or exacerbation of symptoms  3. Pt to demonstrate ability to perform 40# box lift without pain in the right hand      Functional Limitations: carrying objects, lifting, pulling, pushing,  uncomfortable because of pain, reaching overhead and unable to perform repetitive tasks  Plan  Therapy options: will be seen for skilled physical therapy services  Planned modality interventions: cryotherapy, electrical stimulation/Russian stimulation, fluidotherapy, high voltage pulsed current (pain management), iontophoresis, microcurrent electrical stimulation, TENS, thermotherapy (hydrocollator packs) and ultrasound  Planned therapy interventions: ADL retraining, body mechanics training, fine motor coordination training, flexibility, functional ROM exercises, home exercise program, IADL retraining, joint mobilization, manual therapy, motor coordination training, neuromuscular re-education, soft tissue mobilization, strengthening, stretching and therapeutic activities  Frequency: 2x week  Duration in weeks: 6        Evaluation Only     PT SIGNATURE: He Saleem, PT, DPT, OCS, Cert. DN   DATE TREATMENT INITIATED: 3/27/2019    Initial Certification  Certification Period: 6/25/2019  I certify that the therapy services are furnished while this patient is under my care.  The services outlined above are required by this patient, and will be reviewed every 90 days.     PHYSICIAN: Laurence Rodriguez, JAYLA      DATE:     Please sign and return via fax to [phone].. Thank you, [facility].

## 2020-10-08 NOTE — PROGRESS NOTES
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 He had some fatigue towards the end but he did not report any significant increase in pain or any adverse reaction to playing for a long time. He reports that it felt like he had his hand back.       Objective   See Exercise, Manual, and Modality Logs for complete treatment.       Assessment/Plan     Pt is progressing well with therapy and he seems to have less restriction of tendon glide at the incision scars. Able to progress strengthening without exacerbation of symptoms.     Progress per Plan of Care and Progress strengthening /stabilization /functional activity           Manual Therapy:    28     mins  04025;  Therapeutic Exercise:    30     mins  71731;     Neuromuscular Vikki:        mins  93576;    Therapeutic Activity:          mins  38659;     Gait Training:           mins  33074;     Ultrasound:     13     mins  65905;    Electrical Stimulation:         mins  07074 ( );  Iontophoresis          mins 14406   Traction          mins  86611  Fluidotherapy     15     mins  14118  Dry Needling          mins self-pay  Paraffin          mins  89948    Timed Treatment:   71   mins   Total Treatment:     86   mins    He Saleem, PT, DPT, OCS, Cert. DN  Physical Therapist   Patient advised of lab results as annotated